# Patient Record
Sex: FEMALE | Race: WHITE
[De-identification: names, ages, dates, MRNs, and addresses within clinical notes are randomized per-mention and may not be internally consistent; named-entity substitution may affect disease eponyms.]

---

## 2017-01-18 ENCOUNTER — HOSPITAL ENCOUNTER (OUTPATIENT)
Dept: HOSPITAL 47 - RADMAMWWP | Age: 62
Discharge: HOME | End: 2017-01-18
Payer: COMMERCIAL

## 2017-01-18 DIAGNOSIS — Z12.31: Primary | ICD-10-CM

## 2017-01-18 PROCEDURE — 77063 BREAST TOMOSYNTHESIS BI: CPT

## 2017-01-19 NOTE — MM
Reason for exam: screening  (asymptomatic).

Last mammogram was performed 1 year and 1 month ago.



History:

Patient is postmenopausal and is nulliparous.

Family history of breast cancer in maternal aunt and breast cancer in paternal 

aunt.

Benign left mammotome panel of the left breast, October 4, 2013.  Benign left 

mammotome panel of the left breast, October 4, 2013.

Took estrogen for 8 years.



Physical Findings:

A clinical breast exam by your physician is recommended on an annual basis and 

results should be correlated with mammographic findings.



MG 3D Screening Mammo W/Cad

Bilateral CC and MLO view(s) were taken.  LM view(s) were taken of the left 

breast.

Prior study comparison: December 23, 2015, bilateral MG 3d screening mammo w/cad. 

November 6, 2014, bilateral MG screening mammo w CAD.

There are scattered fibroglandular densities.  No significant changes when 

compared with prior studies.





ASSESSMENT: Negative, BI-RAD 1



RECOMMENDATION:

Routine screening mammogram of both breasts in 1 year.

## 2019-02-04 ENCOUNTER — HOSPITAL ENCOUNTER (OUTPATIENT)
Dept: HOSPITAL 47 - RADXRMAIN | Age: 64
Discharge: HOME | End: 2019-02-04
Attending: FAMILY MEDICINE
Payer: MEDICARE

## 2019-02-04 DIAGNOSIS — M79.671: Primary | ICD-10-CM

## 2019-02-04 NOTE — XR
EXAMINATION TYPE: XR foot complete RT

 

DATE OF EXAM: 2/4/2019

 

CLINICAL HISTORY: Rolling injury with lateral pain.

 

TECHNIQUE: Frontal, lateral, and oblique images of the right foot are obtained.

 

COMPARISON: None

 

FINDINGS:  There is no acute fracture/dislocation evident in the right foot.  The joint spaces in the
 right foot appear within normal limits.  The overlying soft tissue appears unremarkable.

 

IMPRESSION:  There is no acute fracture or dislocation in the right foot.